# Patient Record
Sex: FEMALE | Race: WHITE
[De-identification: names, ages, dates, MRNs, and addresses within clinical notes are randomized per-mention and may not be internally consistent; named-entity substitution may affect disease eponyms.]

---

## 2017-10-08 ENCOUNTER — HEALTH MAINTENANCE LETTER (OUTPATIENT)
Age: 42
End: 2017-10-08

## 2020-09-23 ENCOUNTER — APPOINTMENT (OUTPATIENT)
Dept: URBAN - METROPOLITAN AREA CLINIC 252 | Age: 45
Setting detail: DERMATOLOGY
End: 2020-09-23

## 2020-09-23 VITALS — HEIGHT: 65 IN | RESPIRATION RATE: 15 BRPM | WEIGHT: 148 LBS

## 2020-09-23 PROBLEM — L81.0 POSTINFLAMMATORY HYPERPIGMENTATION: Status: ACTIVE | Noted: 2020-09-23

## 2020-09-23 PROCEDURE — 99202 OFFICE O/P NEW SF 15 MIN: CPT

## 2020-09-23 PROCEDURE — OTHER ADDITIONAL NOTES: OTHER

## 2020-09-23 NOTE — PROCEDURE: ADDITIONAL NOTES
Additional Notes: Discussed that the rash that she had has seem to resolve the exception of a discoloration at the site of the rash. Discussed that judging from her pictures, the rash suggests allergic contact dermatitis. Patient has a history of eczema. Does she finish out the pills that she has, but if this ever comes back, return to clinic and would consider a biopsy and or culture. Patient expressed understanding.
Detail Level: Detailed

## 2020-09-23 NOTE — HPI: RASH
Is This A New Presentation, Or A Follow-Up?: Rash
Additional History: Started doxycycline and prednisone started 8 days ago. Doxycycline ended yesterday. Finishing prednisone tomorrow morning took 40mg qam for 4 days and 20mg qam x 6 days. Had a similar rash May 2020.  She reports initially was blistered but no blister this time. Was itchy this time but not last time.

## 2020-10-29 ENCOUNTER — APPOINTMENT (OUTPATIENT)
Dept: URBAN - METROPOLITAN AREA CLINIC 252 | Age: 45
Setting detail: DERMATOLOGY
End: 2020-10-29

## 2020-10-29 VITALS — WEIGHT: 148 LBS | RESPIRATION RATE: 16 BRPM | HEIGHT: 65 IN

## 2020-10-29 PROBLEM — L81.0 POSTINFLAMMATORY HYPERPIGMENTATION: Status: ACTIVE | Noted: 2020-10-29

## 2020-10-29 PROCEDURE — OTHER PATHOLOGY BILLING: OTHER

## 2020-10-29 PROCEDURE — OTHER ADDITIONAL NOTES: OTHER

## 2020-10-29 PROCEDURE — OTHER PRESCRIPTION: OTHER

## 2020-10-29 PROCEDURE — OTHER BIOPSY BY PUNCH METHOD: OTHER

## 2020-10-29 PROCEDURE — 11104 PUNCH BX SKIN SINGLE LESION: CPT

## 2020-10-29 PROCEDURE — OTHER ORDER TESTS: OTHER

## 2020-10-29 PROCEDURE — 88305 TISSUE EXAM BY PATHOLOGIST: CPT

## 2020-10-29 RX ORDER — BETAMETHASONE DIPROPIONATE 0.5 MG/G
CREAM TOPICAL BID
Qty: 1 | Refills: 1 | Status: ERX | COMMUNITY
Start: 2020-10-29

## 2020-10-29 NOTE — PROCEDURE: PATHOLOGY BILLING
Immunohistochemistry ([id number] and [id number]) billing is not performed here. Please use the Immunohistochemistry Stain Billing plan to accomplish this. Immunohistochemistry ([id number] and [id number]) billing is not performed here. Please use the Immunohistochemistry Stain Billing plan to accomplish this.

## 2020-10-29 NOTE — HPI: RASH
Is This A New Presentation, Or A Follow-Up?: Rash
Additional History: Treated with doxycycline and prednisone in past. Was just postinflammatory hyperpigmentation at last office visit. Started resurring 4 days ago, no blisters yet but has in past. It is occasionally itchy.  Cori time this happened was May 2020. This is not the 3rd flare. She takes albuterol and advair discus for year. Stopped oral contraceptive pill a few month.

## 2020-10-29 NOTE — PROCEDURE: ADDITIONAL NOTES
Detail Level: Detailed
Additional Notes: Would consider ilk vs prednisone If topical does not provide relief and pending the culture and biopsy results. Discuss the certain diagnosis is not known. Patient was unable to think of anything that contacts this area.

## 2020-10-29 NOTE — PROCEDURE: BIOPSY BY PUNCH METHOD
X Depth Of Punch In Cm (Optional): 0
Home Suture Removal Text: - Patient or caregiver was provided with a sterile #11 blade and given verbal instructions for suture removal. \\n- If they have any questions, difficulties, or decide they would like the sutures removed in office,  then they will call [facility].
Anesthesia Volume In Cc (Will Not Render If 0): 0.3
Bill For Surgical Tray: no
Epidermal Sutures: gel foam
Was A Bandage Applied: Yes
Dressing: bandage
Detail Level: Detailed
Information: Selecting Yes will display possible errors in your note based on the variables you have selected. This validation is only offered as a suggestion for you. PLEASE NOTE THAT THE VALIDATION TEXT WILL BE REMOVED WHEN YOU FINALIZE YOUR NOTE. IF YOU WANT TO FAX A PRELIMINARY NOTE YOU WILL NEED TO TOGGLE THIS TO 'NO' IF YOU DO NOT WANT IT IN YOUR FAXED NOTE.
Punch Size In Mm: 4
Wound Care: Petrolatum
Post-Care Instructions: WOUND CARE:\\nDo NOT submerge wound in a bath, swimming pool, or hot tub for at least 1 week or for as long as there is an open wound. Gently cleanse the site daily with mild soap and water. Be careful NOT to allow a forceful stream of water to hit the biopsy site. After cleaning/showering, apply a thin layer of petrolatum ointment or Aquaphor in the wound followed by an adhesive bandage. Continue this process daily until healed. \\n\\nBLEEDING:\\nIf you develop persistent bleeding, apply firm and steady pressure over the dressing with gauze for 15 minutes. If bleeding persists, reapply pressure with an ice pack over the gauze for 15 minutes. NEVER APPLY ICE DIRECTLY TO THE SKIN. Do NOT peek under the gauze during these 15 minutes of pressure.  Call our office at [phone] if you cannot get the bleeding to stop. \\n\\nINFECTION:\\nSigns of infection may include increasing rather than decreasing pain (after the first few days), increasing redness/swelling/heat, draining pus, pink/red streaks around the wound, and fever or chills.  Please call our office immediately at [phone] if infection is suspected. It is normal to have some mild redness on or around the wound edges; this will lighten day by day and will become less tender.\\n\\nPAIN:\\nPain is usually minimal, but if needed you may take acetaminophen (Tylenol) every four hours as needed. Applying an ice pack over the dressing for 15-20 minutes every 2-3 hours will relieve swelling, lessen pain, and help minimize bruising. NEVER APPLY ICE DIRECTLY TO THE SKIN. Avoid ibuprofen (Advil, Motrin) and naproxen (Aleve) for the first 48 hours as these can increase bleeding.\\n\\nSWELLING AND BRUISING:\\nSwelling and bruising are common and temporary, usually lasting 1 - 2 weeks. It is more common in areas treated around the eyes, hands, and feet. In the days following the procedure, swelling and bruising can be minimized by keeping the affected areas elevated when possible, reducing salty foods, and applying ice packs over the dressing for 15-20 minutes every 2-3 hours. NEVER APPLY ICE DIRECTLY TO THE SKIN.\\n\\nITCHING:\\nItchiness on and around the wound is very common and can last several days to weeks after surgery. Mild itch is normal as the wound is healing. \\n\\nNERVE CHANGES:\\nNumbness is usually temporary, but it may last for several weeks to months. You may also experience sharp pains at the wound sight as it heals.  Mild pain is normal and will gradually improve with time.\\n \\nNO SMOKING:\\nSmoking will delay the healing process. If you smoke, we recommend trying to quit or at minimum reduce how much you smoke following a procedure.
Hemostasis: Pressure
Biopsy Type: H and E
Anesthesia Type: 2% lidocaine with epinephrine
Path Notes Override (Will Replace All Of The Above Text): Patient has recurrent sometimes itchy and sometimes painful dry erythematous patch. Initially this was vessicular in May 2020. No blistering since then. No new medication. Responds to doxycycline and prednisone from primary care provider. Tissue culture is pending. Only on right thigh.
Billing Type: Third-Party Bill
Consent: - Verbal and written consent was obtained and risks were reviewed prior to procedure today. \\n- Risks discussed include but are not limited to scarring, infection, bleeding, scabbing, incomplete removal, nerve damage, pain, pruritus, and allergy to anesthesia.
Notification Instructions: - It can take up to 2 weeks to get a biopsy result. Please refrain from calling to request results until after 2 weeks.

## 2020-10-29 NOTE — PROCEDURE: PATHOLOGY BILLING
Rendering Text In Billing: The slides will be read by [employer] and reported in the attached document. Rendering Text In Billing: The slides will be read by [employer] and reported in the attached document.